# Patient Record
Sex: FEMALE | Race: BLACK OR AFRICAN AMERICAN | Employment: OTHER | URBAN - METROPOLITAN AREA
[De-identification: names, ages, dates, MRNs, and addresses within clinical notes are randomized per-mention and may not be internally consistent; named-entity substitution may affect disease eponyms.]

---

## 2017-03-16 ENCOUNTER — FOLLOW UP (OUTPATIENT)
Dept: URBAN - METROPOLITAN AREA CLINIC 11 | Facility: CLINIC | Age: 72
End: 2017-03-16

## 2017-03-16 ASSESSMENT — VISUAL ACUITY
OS_SC: 20/200-1
OD_SC: 20/100+1

## 2017-03-16 ASSESSMENT — TONOMETRY
OS_IOP_MMHG: 13
OD_IOP_MMHG: 15

## 2017-03-16 NOTE — PROCEDURE NOTE: CLINICAL
PROCEDURE NOTE: Avastin () OD. Diagnosis: Neovascular AMD with Active CNV. Anesthesia: Topical. Prep: Betadine Drops and Betadine Scrub. Intravitreal injection of Avastin 1.25mg/0.05 ml was given. Injection site: 3-4 mm from the limbus. Patient tolerated procedure well. CF vision checked. There were no complications. Post-op instructions given. Lot #: . Expiration Date: . EXP. Inventory Id: [place]

## 2017-03-16 NOTE — PATIENT DISCUSSION
I have recommended that she have an injection of Avastin while she is here due to an increase in edema seen on OCT and exam. I will see her again in 1 month for Avastin #12 OD.

## 2017-07-05 NOTE — PATIENT DISCUSSION
Ectropion and Midface Descent Counseling:  I have examined the patient today and found them to have ectropion due to laxity of the lower eyelid. I have discussed at length the anatomy and pathophysiology of the problem. The risks and benefits of a lateral tarsal strip and midcheek advancement were discussed in detail as well as the location of the incision and the recovery process. The patient understands and desires a lateral tarsal strip and midcheek advancement procedure to correct the problem.

## 2017-07-05 NOTE — PATIENT DISCUSSION
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

## 2017-08-22 NOTE — PATIENT DISCUSSION
Also, please do not hesitate to call us if you have any concerns not addressed by this information. Please call [phone] and we will do everything we can to help you during this period.

## 2017-08-22 NOTE — PATIENT DISCUSSION
Patient is here for suture removal.  The patient has a normal amount of brusing and swelling consistent with the post operative course. The suture lines are intact, there is no evidence of infection. The plan is to remove the sutures today with an expectation of normal healing. The post op course has been further reviewed with the patient.

## 2017-09-01 NOTE — PATIENT DISCUSSION
Continue hot compresses and lubrication lubrication near term. Recommend FTSG and LTS on OS for cicatricial ectropion. Patient understands risks and benefits and wishes to proceed.

## 2021-07-02 NOTE — PATIENT DISCUSSION
CICATRICIAL ECTROPION, LLL:  SCHEDULE FULL THICKNESS SKIN GRAFT, PREP OF GRAFT SITE, LATERAL TARSAL STRIP AND TEMPORARY TARSORRHAPHY, OS.

## 2021-07-02 NOTE — PATIENT DISCUSSION
PHOTOGRAPHS: I have reviewed the external ocular photographs of this patient which show the following: significant cicatricial ectropion left lower eyelid.

## 2021-08-11 NOTE — PATIENT DISCUSSION
Patient is currently scheduled for FTSG, LTS, PREP OF GRAFT SITE AND TEMPORARY TARSORRHAPHY, OS on October 7, 2021.

## 2021-10-22 NOTE — PATIENT DISCUSSION
Recommend warm compresses 4 times daily and vaseline to the graft site. Follow up in 1 week for removal of residual sutures.

## 2021-11-30 NOTE — PATIENT DISCUSSION
Patient presents w/ a pyogenic granuloma s/p eyelid surgery.  Discussed the r/b of excision with the patient today.  Patient understands and wishes to proceed.  The granuloma was excised without difficulty today and the patient was given written post-operative instructions.   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

## 2021-11-30 NOTE — PROCEDURE NOTE: CLINICAL
PROCEDURE NOTE: Excision of Eyelid Lesion, No Closure Needed #1 OS. Diagnosis: Pyogenic Granuloma. After informed consent the lesion was anesthetized with local anesthetic, 1% lidocane with epinephrine 1:100,000 units. Sterile technique was used to remove the lesion with Telly scissors. Antibiotic ointment was used to treat the area where lesion was removed. Lesion was sent to pathology for analysis. The patient was given written post operative wound care instructions and a prescription for antibiotic ointment. The patient was asked to call  within 10 days if they had not been otherwise called by our office with the result of the biopsy. Mae Glover

## 2022-02-11 NOTE — PATIENT DISCUSSION
Resume normal activity. Resume any medications that were discontinued for surgery. Stop cold compresses Hot compresses to affected lid(s) 2-3 times daily for one month, 5 minutes at a time. Artificial tears prn burning/itching/blurry vision. Wash incisions/ lash line once daily with baby shampoo. Never smoker

## 2022-05-05 ENCOUNTER — NEW PATIENT (OUTPATIENT)
Dept: URBAN - METROPOLITAN AREA CLINIC 11 | Facility: CLINIC | Age: 77
End: 2022-05-05

## 2022-05-05 DIAGNOSIS — H35.3211: ICD-10-CM

## 2022-05-05 DIAGNOSIS — H43.813: ICD-10-CM

## 2022-05-05 DIAGNOSIS — H35.3124: ICD-10-CM

## 2022-05-05 PROCEDURE — 92134 CPTRZ OPH DX IMG PST SGM RTA: CPT

## 2022-05-05 PROCEDURE — 92201 OPSCPY EXTND RTA DRAW UNI/BI: CPT

## 2022-05-05 PROCEDURE — 99204 OFFICE O/P NEW MOD 45 MIN: CPT

## 2022-05-05 ASSESSMENT — TONOMETRY
OD_IOP_MMHG: 13
OS_IOP_MMHG: 13

## 2022-05-05 ASSESSMENT — VISUAL ACUITY
OS_SC: 20/400
OD_PH: 20/40+2
OD_SC: 20/50+2

## 2022-05-05 NOTE — PATIENT DISCUSSION
Lengthy discussion with patient.  Defer any surgery.  We discussed that her cataract is progressing in the right eye and she is cleared for cataract surgery if you and . Sebas Wellss decide to have it done.

## 2022-06-23 RX ORDER — MELOXICAM 15 MG/1
TABLET ORAL
COMMUNITY

## 2022-06-23 RX ORDER — ONDANSETRON 4 MG/1
TABLET, FILM COATED ORAL
COMMUNITY

## 2022-06-23 RX ORDER — DEXAMETHASONE 4 MG/1
TABLET ORAL
COMMUNITY

## 2022-06-23 RX ORDER — ACETAMINOPHEN 325 MG/1
TABLET ORAL
COMMUNITY

## 2022-06-23 RX ORDER — VALSARTAN 320 MG/1
TABLET ORAL
COMMUNITY

## 2022-06-23 RX ORDER — LORAZEPAM 0.5 MG/1
TABLET ORAL
COMMUNITY

## 2022-06-23 RX ORDER — PROMETHAZINE HYDROCHLORIDE 25 MG/1
TABLET ORAL
COMMUNITY

## 2022-06-23 RX ORDER — CONJUGATED ESTROGENS 0.62 MG/G
CREAM VAGINAL
COMMUNITY
Start: 2014-03-18

## 2022-06-23 RX ORDER — OMEPRAZOLE 20 MG/1
1 CAPSULE, DELAYED RELEASE ORAL
COMMUNITY

## 2022-06-23 RX ORDER — AZELASTINE 1 MG/ML
SPRAY, METERED NASAL
COMMUNITY

## 2022-06-23 RX ORDER — AZELASTINE HYDROCHLORIDE, FLUTICASONE PROPIONATE 137; 50 UG/1; UG/1
SPRAY, METERED NASAL
COMMUNITY

## 2022-06-23 RX ORDER — GABAPENTIN 100 MG/1
CAPSULE ORAL
COMMUNITY

## 2022-06-23 RX ORDER — AMLODIPINE BESYLATE 10 MG/1
TABLET ORAL
COMMUNITY

## 2022-06-23 RX ORDER — LETROZOLE 2.5 MG/1
TABLET, FILM COATED ORAL
COMMUNITY

## 2022-06-23 RX ORDER — HYDROCODONE BITARTRATE AND ACETAMINOPHEN 5; 325 MG/1; MG/1
TABLET ORAL
COMMUNITY

## 2022-07-08 NOTE — PATIENT DISCUSSION
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 Upon exam, both corneas are clear. Discussed several options, including further and larger full thickness skin graft procedures (discussed possibility of grafts being taken from neck), but also discussed discontinuing surgical intervention at this time and instead heavily lubricating with AT gel and ointment. Patient voiced understanding and wishes to proceed with lubrication. Patient is aware to call our office if lubrication no longer helps with comfort. Follow up in one year for continued observation, sooner for problems.

## 2022-11-15 ENCOUNTER — FOLLOW UP (OUTPATIENT)
Dept: URBAN - METROPOLITAN AREA CLINIC 11 | Facility: CLINIC | Age: 77
End: 2022-11-15

## 2022-11-15 DIAGNOSIS — H35.3211: ICD-10-CM

## 2022-11-15 DIAGNOSIS — H25.11: ICD-10-CM

## 2022-11-15 DIAGNOSIS — H43.812: ICD-10-CM

## 2022-11-15 DIAGNOSIS — H35.3124: ICD-10-CM

## 2022-11-15 PROCEDURE — 99214 OFFICE O/P EST MOD 30 MIN: CPT

## 2022-11-15 PROCEDURE — 92134 CPTRZ OPH DX IMG PST SGM RTA: CPT

## 2022-11-15 ASSESSMENT — TONOMETRY
OS_IOP_MMHG: 17
OD_IOP_MMHG: 18

## 2022-11-15 ASSESSMENT — VISUAL ACUITY
OD_SC: 20/70-1
OS_SC: 20/400
OD_PH: 20/40+2

## 2022-11-15 NOTE — PATIENT DISCUSSION
The patient is cleared for cataract surgery. You may proceed when you and . Jazzy Carrier make a decision.

## 2023-05-25 ENCOUNTER — COMPREHENSIVE EXAM (OUTPATIENT)
Dept: URBAN - METROPOLITAN AREA CLINIC 11 | Facility: CLINIC | Age: 78
End: 2023-05-25

## 2023-05-25 DIAGNOSIS — H43.812: ICD-10-CM

## 2023-05-25 DIAGNOSIS — Z96.1: ICD-10-CM

## 2023-05-25 DIAGNOSIS — H35.3124: ICD-10-CM

## 2023-05-25 DIAGNOSIS — H25.11: ICD-10-CM

## 2023-05-25 DIAGNOSIS — H35.3211: ICD-10-CM

## 2023-05-25 PROCEDURE — 92134 CPTRZ OPH DX IMG PST SGM RTA: CPT

## 2023-05-25 PROCEDURE — 99214 OFFICE O/P EST MOD 30 MIN: CPT

## 2023-05-25 ASSESSMENT — TONOMETRY
OD_IOP_MMHG: 13
OS_IOP_MMHG: 24

## 2023-05-25 ASSESSMENT — VISUAL ACUITY
OS_SC: 20/400
OD_SC: 20/40+1
OU_SC: 20/50-2

## 2024-03-28 ENCOUNTER — COMPREHENSIVE EXAM (OUTPATIENT)
Dept: URBAN - METROPOLITAN AREA CLINIC 11 | Facility: CLINIC | Age: 79
End: 2024-03-28

## 2024-03-28 DIAGNOSIS — H35.3211: ICD-10-CM

## 2024-03-28 DIAGNOSIS — H43.813: ICD-10-CM

## 2024-03-28 DIAGNOSIS — H35.3124: ICD-10-CM

## 2024-03-28 PROCEDURE — 99214 OFFICE O/P EST MOD 30 MIN: CPT

## 2024-03-28 PROCEDURE — 92134 CPTRZ OPH DX IMG PST SGM RTA: CPT

## 2024-03-28 ASSESSMENT — VISUAL ACUITY
OD_PH: 20/30-2
OD_SC: 20/60+1
OS_SC: 20/400

## 2024-03-28 ASSESSMENT — TONOMETRY
OD_IOP_MMHG: 9
OS_IOP_MMHG: 9